# Patient Record
Sex: FEMALE | Race: WHITE | NOT HISPANIC OR LATINO | Employment: UNEMPLOYED | URBAN - METROPOLITAN AREA
[De-identification: names, ages, dates, MRNs, and addresses within clinical notes are randomized per-mention and may not be internally consistent; named-entity substitution may affect disease eponyms.]

---

## 2024-06-12 ENCOUNTER — OFFICE VISIT (OUTPATIENT)
Dept: PRIMARY CARE | Facility: CLINIC | Age: 24
End: 2024-06-12
Payer: COMMERCIAL

## 2024-06-12 VITALS
SYSTOLIC BLOOD PRESSURE: 119 MMHG | BODY MASS INDEX: 22.32 KG/M2 | DIASTOLIC BLOOD PRESSURE: 80 MMHG | HEART RATE: 100 BPM | HEIGHT: 63 IN | WEIGHT: 126 LBS | OXYGEN SATURATION: 97 % | RESPIRATION RATE: 16 BRPM | TEMPERATURE: 98.6 F

## 2024-06-12 DIAGNOSIS — Z76.89 ENCOUNTER TO ESTABLISH CARE: ICD-10-CM

## 2024-06-12 DIAGNOSIS — H92.01 OTALGIA OF RIGHT EAR: ICD-10-CM

## 2024-06-12 DIAGNOSIS — J02.9 SORE THROAT: Primary | ICD-10-CM

## 2024-06-12 DIAGNOSIS — R09.81 NASAL CONGESTION: ICD-10-CM

## 2024-06-12 PROBLEM — F34.1 PERSISTENT DEPRESSIVE DISORDER: Status: ACTIVE | Noted: 2018-10-30

## 2024-06-12 PROBLEM — Z97.3 WEARS GLASSES: Status: ACTIVE | Noted: 2021-06-28

## 2024-06-12 PROBLEM — M41.129 ADOLESCENT IDIOPATHIC SCOLIOSIS: Status: ACTIVE | Noted: 2024-06-12

## 2024-06-12 PROCEDURE — 99213 OFFICE O/P EST LOW 20 MIN: CPT | Performed by: NURSE PRACTITIONER

## 2024-06-12 PROCEDURE — 87880 STREP A ASSAY W/OPTIC: CPT | Mod: QW | Performed by: NURSE PRACTITIONER

## 2024-06-12 RX ORDER — ALBUTEROL SULFATE 90 UG/1
2 AEROSOL, METERED RESPIRATORY (INHALATION) EVERY 4 HOURS PRN
COMMUNITY
Start: 2023-10-02

## 2024-06-12 RX ORDER — FLUTICASONE PROPIONATE AND SALMETEROL XINAFOATE 230; 21 UG/1; UG/1
1 AEROSOL, METERED RESPIRATORY (INHALATION) 2 TIMES DAILY
COMMUNITY
Start: 2014-06-26

## 2024-06-12 RX ORDER — ESCITALOPRAM OXALATE 10 MG/1
10 TABLET ORAL DAILY
COMMUNITY
Start: 2023-04-20

## 2024-06-12 RX ORDER — MELOXICAM 15 MG/1
15 TABLET ORAL DAILY
COMMUNITY
Start: 2023-08-14

## 2024-06-12 RX ORDER — MONTELUKAST SODIUM 10 MG/1
1 TABLET ORAL DAILY
COMMUNITY
Start: 2022-09-23

## 2024-06-12 RX ORDER — AMOXICILLIN AND CLAVULANATE POTASSIUM 875; 125 MG/1; MG/1
875 TABLET, FILM COATED ORAL 2 TIMES DAILY
Qty: 14 TABLET | Refills: 0 | Status: SHIPPED | OUTPATIENT
Start: 2024-06-12 | End: 2024-06-19

## 2024-06-12 RX ORDER — LEVONORGESTREL AND ETHINYL ESTRADIOL 0.15-0.03
1 KIT ORAL
COMMUNITY
Start: 2023-03-29

## 2024-06-12 SDOH — ECONOMIC STABILITY: FOOD INSECURITY: WITHIN THE PAST 12 MONTHS, YOU WORRIED THAT YOUR FOOD WOULD RUN OUT BEFORE YOU GOT MONEY TO BUY MORE.: NEVER TRUE

## 2024-06-12 SDOH — ECONOMIC STABILITY: FOOD INSECURITY: WITHIN THE PAST 12 MONTHS, THE FOOD YOU BOUGHT JUST DIDN'T LAST AND YOU DIDN'T HAVE MONEY TO GET MORE.: NEVER TRUE

## 2024-06-12 ASSESSMENT — LIFESTYLE VARIABLES
HOW OFTEN DO YOU HAVE SIX OR MORE DRINKS ON ONE OCCASION: NEVER
AUDIT-C TOTAL SCORE: 1
HOW MANY STANDARD DRINKS CONTAINING ALCOHOL DO YOU HAVE ON A TYPICAL DAY: 1 OR 2
SKIP TO QUESTIONS 9-10: 1
HOW OFTEN DO YOU HAVE A DRINK CONTAINING ALCOHOL: MONTHLY OR LESS

## 2024-06-12 ASSESSMENT — COLUMBIA-SUICIDE SEVERITY RATING SCALE - C-SSRS
1. IN THE PAST MONTH, HAVE YOU WISHED YOU WERE DEAD OR WISHED YOU COULD GO TO SLEEP AND NOT WAKE UP?: NO
6. HAVE YOU EVER DONE ANYTHING, STARTED TO DO ANYTHING, OR PREPARED TO DO ANYTHING TO END YOUR LIFE?: NO
2. HAVE YOU ACTUALLY HAD ANY THOUGHTS OF KILLING YOURSELF?: NO

## 2024-06-12 ASSESSMENT — ENCOUNTER SYMPTOMS
OCCASIONAL FEELINGS OF UNSTEADINESS: 0
DEPRESSION: 0
LOSS OF SENSATION IN FEET: 0

## 2024-06-12 ASSESSMENT — PAIN SCALES - GENERAL: PAINLEVEL: 0-NO PAIN

## 2024-06-12 ASSESSMENT — PATIENT HEALTH QUESTIONNAIRE - PHQ9
2. FEELING DOWN, DEPRESSED OR HOPELESS: NOT AT ALL
SUM OF ALL RESPONSES TO PHQ9 QUESTIONS 1 AND 2: 0
1. LITTLE INTEREST OR PLEASURE IN DOING THINGS: NOT AT ALL

## 2024-06-12 NOTE — PROGRESS NOTES
"Subjective   Patient ID: Awilda Champion is a 23 y.o. female who presents for Establish Care and New Patient Visit (NPV- patient presents as new patient visit to establish new PCP.).  HPI 23-year-old female with past medical history of allergic rhinitis, mild persistent depressive disorder, scoliosis and moderate asthma presents today to establish care.    Sore throat, right greater than left ear pain and frontal sinus congestion x 3 days   No fever chills n/v  Ibuprofen   No exposure to strep or covid    On lexapro for 2 years -- depression.   Not in counseling   Never hospitalized for depression.    Denies suicidal/homicidal ideation plan.    Dr Vu's following --gyn and ocps    Scoliosis - mobic as needed.  No past surgical history.  Review of Systems  Review of systems: Present-not feeling well. Not present-chills and fever.  Skin: Not present- new lesions and rash.  HEENT: See HPI.  Neck: Not present-neck pain, neck stiffness and swollen glands.  Respiratory: Not present-difficulty breathing, cough, bloody sputum.  Cardiovascular: Not present-chest pain, palpitations, dyspnea on exertion.  Gastrointestinal: Not present-abdominal pain, bloody or very black stools, changes in bowel habits, heartburn, jaundice, nausea and vomiting.  Genitourinary: Not present-change in bladder habits, hematuria, flank pain and dysuria.  Musculoskeletal: Not present- joint swelling, joint redness.  Psychological: Depression controlled on current medical regime.  Patient denies any suicidal/homicidal ideation or plan.    Objective   /80 (BP Location: Right arm, Patient Position: Sitting, BP Cuff Size: Adult)   Pulse 100   Temp 37 °C (98.6 °F)   Resp 16   Ht 1.6 m (5' 3\")   Wt 57.2 kg (126 lb)   LMP 05/21/2024 (Approximate)   SpO2 97%   BMI 22.32 kg/m²      Physical Exam  Gen.: Mental status-alert. Gen. appearance-cooperative, well groomed and consistent with stated age. Sickly. Orientation-oriented to time, " place, purpose and person. Build and nutrition-well-nourished and well-developed. Hydration-well-hydrated.    Integumentary: Color-normal coloration skin. Skin moisture-normal skin moisture.    Head and neck: Head-normocephalic, atraumatic with no lesions or palpable masses. Face-atraumatic. Neck-full range of motion and subtle. No lymphadenopathy and no nuchal rigidity.     ENMT: Ears-no tenderness noted to the external auditory canal-bilaterally. Otoscopic exam: Tympanic membranes-left-tympanic membrane is deena, injected in appearance. Right-tympanic membrane is deena injected bulging in appearance. Nose -frontal sinuses-non-tender and no purulent drainage. Maxillary sinuses-non-tender and no purulent drainage. Mouth: Oral mucosa-pink and moist. Oropharynx: No airway distress, bulging of the pharyngeal wall, edema of the uvula.  Mild to moderate pharyngeal erythema with exudate.    Chest and lung exam:  Auscultation-normal breath sounds, no adventitious lung sounds and normal vocal resonance. Chest wall is normal in shape and non-tender.    Cardiovascular: Auscultation: Regular rate and rhythm. Heart sounds-normal heart sounds, S1-S2. No murmurs or gallops appreciated. Carotid arteries normal and without bruit.    Peripheral vascular: Lower extremity-inspection is normal bilaterally. Normal temperature and no edema bilaterally.    Assessment/Plan   Diagnoses and all orders for this visit:  Sore throat  -     POCT Rapid Strep A manually resulted  Nasal congestion  Otalgia of right ear  Encounter to establish care

## 2024-06-12 NOTE — PATIENT INSTRUCTIONS
New patient-establish care.  Sore throat, nasal congestion and ear pain   - Patient given a prescription for Augmentin as directed.  - Recommend decongestant, Flonase  - Increase fluids and rest.  Contact office with worsening condition or no resolve over the next 72 hours.  In office strep negative.    Follow-up in the next few weeks for a annual physical exam.

## 2024-06-19 ENCOUNTER — OFFICE VISIT (OUTPATIENT)
Dept: PRIMARY CARE | Facility: CLINIC | Age: 24
End: 2024-06-19
Payer: COMMERCIAL

## 2024-06-19 ENCOUNTER — LAB (OUTPATIENT)
Dept: LAB | Facility: LAB | Age: 24
End: 2024-06-19
Payer: COMMERCIAL

## 2024-06-19 VITALS
BODY MASS INDEX: 22.08 KG/M2 | OXYGEN SATURATION: 97 % | HEIGHT: 63 IN | SYSTOLIC BLOOD PRESSURE: 106 MMHG | TEMPERATURE: 97.7 F | RESPIRATION RATE: 16 BRPM | HEART RATE: 93 BPM | WEIGHT: 124.6 LBS | DIASTOLIC BLOOD PRESSURE: 71 MMHG

## 2024-06-19 DIAGNOSIS — Z13.0 SCREENING FOR DEFICIENCY ANEMIA: ICD-10-CM

## 2024-06-19 DIAGNOSIS — F41.9 ANXIETY: ICD-10-CM

## 2024-06-19 DIAGNOSIS — F34.1 PERSISTENT DEPRESSIVE DISORDER: ICD-10-CM

## 2024-06-19 DIAGNOSIS — Z13.220 LIPID SCREENING: ICD-10-CM

## 2024-06-19 DIAGNOSIS — Z13.89 SCREENING FOR BLOOD OR PROTEIN IN URINE: ICD-10-CM

## 2024-06-19 DIAGNOSIS — Z23 IMMUNIZATION DUE: ICD-10-CM

## 2024-06-19 DIAGNOSIS — Z00.00 ANNUAL PHYSICAL EXAM: Primary | ICD-10-CM

## 2024-06-19 DIAGNOSIS — Z00.00 ANNUAL PHYSICAL EXAM: ICD-10-CM

## 2024-06-19 PROBLEM — L70.9 ACNE: Status: ACTIVE | Noted: 2024-06-19

## 2024-06-19 PROBLEM — R06.02 SHORTNESS OF BREATH: Status: ACTIVE | Noted: 2024-06-19

## 2024-06-19 PROBLEM — N94.6 DYSMENORRHEA: Status: ACTIVE | Noted: 2024-06-19

## 2024-06-19 LAB
ALBUMIN SERPL BCP-MCNC: 4.1 G/DL (ref 3.4–5)
ALP SERPL-CCNC: 53 U/L (ref 33–110)
ALT SERPL W P-5'-P-CCNC: 42 U/L (ref 7–45)
ANION GAP SERPL CALC-SCNC: 11 MMOL/L (ref 10–20)
AST SERPL W P-5'-P-CCNC: 35 U/L (ref 9–39)
BILIRUB SERPL-MCNC: 0.4 MG/DL (ref 0–1.2)
BUN SERPL-MCNC: 10 MG/DL (ref 6–23)
CALCIUM SERPL-MCNC: 9.1 MG/DL (ref 8.6–10.3)
CHLORIDE SERPL-SCNC: 104 MMOL/L (ref 98–107)
CHOLEST SERPL-MCNC: 178 MG/DL (ref 0–199)
CHOLESTEROL/HDL RATIO: 4.5
CO2 SERPL-SCNC: 27 MMOL/L (ref 21–32)
CREAT SERPL-MCNC: 0.83 MG/DL (ref 0.5–1.05)
EGFRCR SERPLBLD CKD-EPI 2021: >90 ML/MIN/1.73M*2
ERYTHROCYTE [DISTWIDTH] IN BLOOD BY AUTOMATED COUNT: 12.2 % (ref 11.5–14.5)
GLUCOSE SERPL-MCNC: 80 MG/DL (ref 74–99)
HCT VFR BLD AUTO: 42.8 % (ref 36–46)
HDLC SERPL-MCNC: 39.2 MG/DL
HGB BLD-MCNC: 14.3 G/DL (ref 12–16)
LDLC SERPL CALC-MCNC: 102 MG/DL
MCH RBC QN AUTO: 29.2 PG (ref 26–34)
MCHC RBC AUTO-ENTMCNC: 33.4 G/DL (ref 32–36)
MCV RBC AUTO: 87 FL (ref 80–100)
NON HDL CHOLESTEROL: 139 MG/DL (ref 0–149)
NRBC BLD-RTO: 0 /100 WBCS (ref 0–0)
PLATELET # BLD AUTO: 204 X10*3/UL (ref 150–450)
POTASSIUM SERPL-SCNC: 4.1 MMOL/L (ref 3.5–5.3)
PROT SERPL-MCNC: 7.1 G/DL (ref 6.4–8.2)
RBC # BLD AUTO: 4.9 X10*6/UL (ref 4–5.2)
SODIUM SERPL-SCNC: 138 MMOL/L (ref 136–145)
TRIGL SERPL-MCNC: 183 MG/DL (ref 0–149)
TSH SERPL-ACNC: 1.51 MIU/L (ref 0.44–3.98)
VLDL: 37 MG/DL (ref 0–40)
WBC # BLD AUTO: 5 X10*3/UL (ref 4.4–11.3)

## 2024-06-19 PROCEDURE — 84443 ASSAY THYROID STIM HORMONE: CPT

## 2024-06-19 PROCEDURE — 85027 COMPLETE CBC AUTOMATED: CPT

## 2024-06-19 PROCEDURE — 80053 COMPREHEN METABOLIC PANEL: CPT

## 2024-06-19 PROCEDURE — 36415 COLL VENOUS BLD VENIPUNCTURE: CPT

## 2024-06-19 PROCEDURE — 80061 LIPID PANEL: CPT

## 2024-06-19 PROCEDURE — 99214 OFFICE O/P EST MOD 30 MIN: CPT | Performed by: NURSE PRACTITIONER

## 2024-06-19 PROCEDURE — 90715 TDAP VACCINE 7 YRS/> IM: CPT | Performed by: NURSE PRACTITIONER

## 2024-06-19 ASSESSMENT — ENCOUNTER SYMPTOMS
OCCASIONAL FEELINGS OF UNSTEADINESS: 0
DEPRESSION: 0
LOSS OF SENSATION IN FEET: 0

## 2024-06-19 ASSESSMENT — PAIN SCALES - GENERAL: PAINLEVEL: 0-NO PAIN

## 2024-06-19 NOTE — PATIENT INSTRUCTIONS
Annual exam   Fasting labs to be obtained.   She will be notified of results as they become available.   Follow-up in 1 year or sooner as needed.    Tdap without adverse reaction.

## 2024-06-19 NOTE — PROGRESS NOTES
Subjective   Patient ID: Awilda Champion is a 23 y.o. female who presents for Annual Exam (Patient presents for Annual physical Exam visit today.).  HPI 23-year-old female with past medical history of asthma and major depression presents today for annual physical exam.  Depression controlled on current medical regime per patient.  She denies suicidal/homicidal ideation.  Asthma under good control presently on current medical regime.    Declines hiv and hep c screening   No smoking or vaping     Review of Systems  Review of systems: Present-feeling well. Not present-chills, fatigue and fever.  Skin: Not present-change in wart/mole, dryness, hives, new lesions and rash.  HEENT: Not present-headache, diplopia, visual loss, ear pain, tinnitus, vertigo, seasonal allergies, nasal congestion, and sore throat.  Neck: Not present-neck pain, neck stiffness and swollen glands.  Respiratory: Not present-difficulty breathing, cough, bloody sputum.  Cardiovascular: Not present-abnormal blood pressure, chest pain, edema, fainting, leg pain, leg swelling, palpitations, dyspnea on exertion, shortness of breath and slow heart rate.  Gastrointestinal: Not present-abdominal pain, bloody or very black stools, changes in bowel habits, heartburn, incontinence of stool, jaundice, nausea and vomiting.  Genitourinary: Not present-change in bladder habits, hematuria, flank pain, frequency, urinary incontinence, urgency and dysuria.  Musculoskeletal: Not present-back pain, claudication, joint pain, joint swelling, joint redness, and muscular weakness.  Neurological: Not present-dizziness, headache, trouble walking, unsteadiness, vertigo, weakness, numbness or tingling.  Psychiatric: Not present-anxiety, impaired cognitive functioning, insomnia, depression, trouble falling asleep, panic attacks, suicidal ideation, suicidal planning, thoughts of hurting others and thoughts of self-harm.  Endocrine: Not present-appetite changes, polydipsia,  "polyuria, and thyroid problems.  Hematology:  Not present-anemia, excessive bleeding, bruising and epistaxis.    Objective   /71 (BP Location: Right arm, Patient Position: Sitting, BP Cuff Size: Adult)   Pulse 93   Temp 36.5 °C (97.7 °F) (Temporal)   Resp 16   Ht 1.6 m (5' 3\")   Wt 56.5 kg (124 lb 9.6 oz)   LMP 06/19/2024 (Approximate)   SpO2 97%   BMI 22.07 kg/m²      Physical Exam  Gen.: Mental status-alert. Gen. appearance-cooperative, well groomed and consistent with stated age. Not in acute distress or sickly. Orientation-oriented to time, place, purpose and person. Build and nutrition-well-nourished and well-developed. Hydration-well-hydrated.    Integumentary: Color-normal coloration skin. Skin moisture-normal skin moisture.    Head and neck: Head-normocephalic, atraumatic with no lesions or palpable masses. Face-atraumatic. Neck-full range of motion and subtle. No lymphadenopathy and no nuchal rigidity. Thyroid-normal size and consistency with no palpable lumps.    ENMT: Ears-no tenderness noted to the external auditory canal-bilaterally. Otoscopic exam: Tympanic membranes-left-tympanic membrane is gray in appearance. Right-tympanic membrane is gray in appearance. Nose -frontal sinuses-non-tender and no purulent drainage. Maxillary sinuses-non-tender and no purulent drainage. Mouth: Oral mucosa-pink and moist. Oropharynx: No airway distress, bulging of the pharyngeal wall, edema of the uvula, and no pharyngeal erythema.    Chest and lung exam:  Auscultation-normal breath sounds, no adventitious lung sounds and normal vocal resonance. Chest wall is normal in shape and non-tender.    Cardiovascular: Auscultation: Regular rate and rhythm. Heart sounds-normal heart sounds, S1-S2. No murmurs or gallops appreciated. Carotid arteries normal and without bruit.    Abdomen: Non-tender, no rigidity, and no palpable masses. Auscultation-auscultation of the abdomen reveals normal bowel sounds throughout. "     Peripheral vascular: Lower extremity-inspection is normal bilaterally. Normal temperature and no edema bilaterally.    Neurologic: Mental [id number]. Cranial nerves: Cranial nerves II through XII grossly intact. Normal gait.    Musculoskeletal: Examination of the spine with no step-offs or point tenderness.  Full range of motion of the spine without pain or difficulty. Right lower extremity-normal strength and tone, normal range of motion without pain. Left lower extremity-normal strength and tone, normal range of motion without pain.  Assessment/Plan   Diagnoses and all orders for this visit:  Annual physical exam  -     CBC; Future  -     Comprehensive Metabolic Panel; Future  -     Lipid Panel; Future  -     TSH with reflex to Free T4 if abnormal; Future  Anxiety  -     TSH with reflex to Free T4 if abnormal; Future  Lipid screening  -     Lipid Panel; Future  Persistent depressive disorder  -     Comprehensive Metabolic Panel; Future  Screening for deficiency anemia  -     CBC; Future  Screening for blood or protein in urine  Immunization due  -     Tdap vaccine, age 7 years and older  (BOOSTRIX)

## 2024-08-14 DIAGNOSIS — N94.6 DYSMENORRHEA: Primary | ICD-10-CM

## 2024-08-14 RX ORDER — LEVONORGESTREL AND ETHINYL ESTRADIOL 0.15-0.03
1 KIT ORAL
Qty: 90 TABLET | Refills: 3 | Status: SHIPPED | OUTPATIENT
Start: 2024-08-14

## 2024-09-04 ENCOUNTER — APPOINTMENT (OUTPATIENT)
Dept: OBSTETRICS AND GYNECOLOGY | Facility: CLINIC | Age: 24
End: 2024-09-04
Payer: COMMERCIAL

## 2024-10-11 ENCOUNTER — APPOINTMENT (OUTPATIENT)
Dept: OBSTETRICS AND GYNECOLOGY | Facility: CLINIC | Age: 24
End: 2024-10-11
Payer: COMMERCIAL

## 2024-10-11 VITALS
WEIGHT: 124 LBS | HEIGHT: 63 IN | SYSTOLIC BLOOD PRESSURE: 100 MMHG | DIASTOLIC BLOOD PRESSURE: 68 MMHG | BODY MASS INDEX: 21.97 KG/M2

## 2024-10-11 DIAGNOSIS — Z01.419 ENCOUNTER FOR ANNUAL ROUTINE GYNECOLOGICAL EXAMINATION: Primary | ICD-10-CM

## 2024-10-11 DIAGNOSIS — N94.6 DYSMENORRHEA: ICD-10-CM

## 2024-10-11 PROCEDURE — 3008F BODY MASS INDEX DOCD: CPT | Performed by: OBSTETRICS & GYNECOLOGY

## 2024-10-11 PROCEDURE — 1036F TOBACCO NON-USER: CPT | Performed by: OBSTETRICS & GYNECOLOGY

## 2024-10-11 PROCEDURE — 99385 PREV VISIT NEW AGE 18-39: CPT | Performed by: OBSTETRICS & GYNECOLOGY

## 2024-10-11 RX ORDER — LEVONORGESTREL AND ETHINYL ESTRADIOL 0.15-0.03
1 KIT ORAL
Qty: 90 TABLET | Refills: 4 | Status: SHIPPED | OUTPATIENT
Start: 2024-10-11

## 2024-10-11 ASSESSMENT — PAIN SCALES - GENERAL: PAINLEVEL: 0-NO PAIN

## 2024-10-11 NOTE — PROGRESS NOTES
"Assessment     PLAN  1. Encounter for annual routine gynecological examination  - pap up to date    2. Dysmenorrhea  - well controlled with OCP, refills sent  - levonorgestreL-ethinyl estrad ([place]o, 28,) 0.15-0.03 mg tablet; Take 1 tablet by mouth once daily.  Dispense: 90 tablet; Refill: 4    Please return for your next visit in 1 year or sooner as needed.    Deya Mei MD      Subjective     Awilda Champion is a 24 y.o. female who is here for a routine exam.   PCP = Lydia Mackay, APRN-CNP    APE Concerns: Denies    Gynecologic History:    OBHx: G0  Menses: regular, no concerns, pain resolved with OCP  Last Pap: NILM 8/2023  HPV Vaccine: thinks so, will confirm  History of Dysplasia: denies  Sexually active: yes  STI testing: denies  Contraception: OCP, needs refills  Mammogram: NA  FamHx of GYN cancers:  Denies      PMH, PSH, FH, SH, medications and allergies reviewed and edited as needed.      Objective   /68   Ht 1.6 m (5' 3\")   Wt 56.2 kg (124 lb)   LMP 10/09/2024 (Exact Date)   BMI 21.97 kg/m²      General:   Alert and oriented, in no acute distress   Neck: Supple. No visible thyromegaly.    Breast/Axilla: Normal to palpation bilaterally without masses, skin changes, or nipple discharge.    Abdomen: Soft, non-tender, without masses or organomegaly   Vulva: Normal architecture without erythema, masses, or lesions.    Vagina: Normal mucosa without lesions, masses, or atrophy. No abnormal vaginal discharge.    Cervix: Normal without masses, lesions, or signs of cervicitis.    Uterus: Normal mobile, non-enlarged uterus    Adnexa: Normal without masses or lesions   Pelvic Floor No POP noted. No high tone pelvic floor   Psych Normal affect. Normal mood.        "

## 2025-01-07 ENCOUNTER — TELEPHONE (OUTPATIENT)
Dept: OBSTETRICS AND GYNECOLOGY | Facility: CLINIC | Age: 25
End: 2025-01-07
Payer: COMMERCIAL

## 2025-01-07 DIAGNOSIS — J45.40 MODERATE PERSISTENT ASTHMA WITHOUT COMPLICATION (HHS-HCC): Primary | ICD-10-CM

## 2025-01-08 RX ORDER — MONTELUKAST SODIUM 10 MG/1
10 TABLET ORAL DAILY
Qty: 90 TABLET | Refills: 1 | Status: SHIPPED | OUTPATIENT
Start: 2025-01-08

## 2025-04-08 ENCOUNTER — TELEPHONE (OUTPATIENT)
Dept: OBSTETRICS AND GYNECOLOGY | Facility: CLINIC | Age: 25
End: 2025-04-08
Payer: COMMERCIAL

## 2025-04-10 DIAGNOSIS — F41.9 ANXIETY: Primary | ICD-10-CM

## 2025-04-10 RX ORDER — ESCITALOPRAM OXALATE 10 MG/1
10 TABLET ORAL DAILY
Qty: 30 TABLET | Refills: 0 | OUTPATIENT
Start: 2025-04-10 | End: 2025-05-10

## 2025-04-10 RX ORDER — ESCITALOPRAM OXALATE 10 MG/1
10 TABLET ORAL DAILY
Qty: 30 TABLET | Refills: 1 | Status: SHIPPED | OUTPATIENT
Start: 2025-04-10

## 2025-04-11 DIAGNOSIS — J45.40 MODERATE PERSISTENT ASTHMA WITHOUT COMPLICATION (HHS-HCC): ICD-10-CM

## 2025-04-14 RX ORDER — FLUTICASONE PROPIONATE AND SALMETEROL 250; 50 UG/1; UG/1
1 POWDER RESPIRATORY (INHALATION) 2 TIMES DAILY
Qty: 60 EACH | Refills: 1 | Status: SHIPPED | OUTPATIENT
Start: 2025-04-14

## 2025-04-16 ENCOUNTER — TELEPHONE (OUTPATIENT)
Dept: PRIMARY CARE | Facility: CLINIC | Age: 25
End: 2025-04-16
Payer: COMMERCIAL

## 2025-07-15 DIAGNOSIS — N94.6 DYSMENORRHEA: ICD-10-CM

## 2025-07-15 RX ORDER — LEVONORGESTREL AND ETHINYL ESTRADIOL 0.15-0.03
1 KIT ORAL DAILY
Qty: 28 TABLET | Refills: 0 | Status: SHIPPED | OUTPATIENT
Start: 2025-07-15

## 2025-07-21 NOTE — PROGRESS NOTES
Subjective   Patient ID: Awilda Champion is a 24 y.o. female who presents for No chief complaint on file..  HPI 24-year-old female with past medical history of asthma and major depression presents today for annual physical exam.    Depression controlled on current medical regime per patient.  She denies suicidal/homicidal ideation.  Asthma under good control presently on current medical regime.     Declines hiv and hep c screening   No smoking or vaping     Care team  Aliya Mei - gyn     Last eye exam: wears glasses - 1 year ago   Last dental exam: a month ago   Last pap test: one year ago   Immunizations: up to date   Declines hiv and hep c     Review of Systems  Review of systems: Present-feeling well. Not present-chills, fatigue and fever.  Skin: Not present- new lesions and rash.  HEENT: Not present-headache, ear pain, seasonal allergies, nasal congestion, and sore throat.  Neck: Not present-neck pain, neck stiffness and swollen glands.  Respiratory: Not present-difficulty breathing, cough, bloody sputum.  Cardiovascular: Not present-abnormal blood pressure, chest pain, edema, dyspnea on exertion.  Gastrointestinal: Not present-abdominal pain, bloody or very black stools, changes in bowel habits, heartburn, nausea and vomiting.  Genitourinary: Not present-change in bladder habits, hematuria, flank pain, frequency, urinary incontinence, urgency and dysuria.  Musculoskeletal: Not present-joint pain, joint swelling, joint redness.  Neurological: Not present-dizziness, headache, trouble walking, vertigo, weakness, numbness or tingling.  Psychiatric: Not present-anxiety, impaired cognitive functioning, insomnia, depression, trouble falling asleep, panic attacks, suicidal ideation, suicidal planning, thoughts of hurting others and thoughts of self-harm.  Endocrine: Not present-appetite changes, polydipsia, polyuria, and thyroid problems.  Hematology:  Not present-anemia, excessive bleeding, bruising and  epistaxis.    Objective   There were no vitals taken for this visit.     Physical Exam  Gen.: Mental status-alert. Gen. appearance-cooperative, well groomed and consistent with stated age. Not in acute distress or sickly. Orientation-oriented to time, place, purpose and person. Build and nutrition-well-nourished and well-developed. Hydration-well-hydrated.    Integumentary: Color-normal coloration skin. Skin moisture-normal skin moisture.    Head and neck: Head-normocephalic, atraumatic with no lesions or palpable masses. Face-atraumatic. Neck-full range of motion and subtle. No lymphadenopathy and no nuchal rigidity. Thyroid-normal size and consistency with no palpable lumps.    ENMT: Ears-no tenderness noted to the external auditory canal-bilaterally. Otoscopic exam: Tympanic membranes-left-tympanic membrane is gray in appearance. Right-tympanic membrane is gray in appearance. Nose -frontal sinuses-non-tender and no purulent drainage. Maxillary sinuses-non-tender and no purulent drainage. Mouth: Oral mucosa-pink and moist. Oropharynx: No airway distress, bulging of the pharyngeal wall, edema of the uvula, and no pharyngeal erythema.    Chest and lung exam: Auscultation-normal breath sounds, no adventitious lung sounds and normal vocal resonance. Chest wall is normal in shape and non-tender.    Cardiovascular: Auscultation: Regular rate and rhythm. Heart sounds-normal heart sounds, S1-S2. No murmurs appreciated. Carotid arteries normal and without bruit.    Abdomen: Non-tender, no rigidity, and no palpable masses. There is no hepatosplenomegaly. Auscultation-auscultation of the abdomen reveals normal bowel sounds throughout.     Peripheral vascular: Normal temperature and no edema bilaterally.    Neurologic: Mental [id number]. Cranial nerves: Cranial nerves II through XII grossly intact. Normal gait.    Musculoskeletal: Examination of the spine with no step-offs or point tenderness.  Full range of  motion of the spine without pain or difficulty.   Right lower extremity-normal strength and tone, normal range of motion without pain.   Left lower extremity-normal strength and tone, normal range of motion without pain.  Assessment/Plan   Diagnoses and all orders for this visit:  Annual physical exam  -     CBC; Future  -     Comprehensive Metabolic Panel; Future  -     Lipid Panel; Future  -     TSH with reflex to Free T4 if abnormal; Future  -     POCT UA Automated manually resulted  Screening for deficiency anemia  -     CBC; Future  Anxiety  -     TSH with reflex to Free T4 if abnormal; Future  -     escitalopram (Lexapro) 10 mg tablet; Take 1 tablet (10 mg) by mouth once daily.  Screening for blood or protein in urine  -     POCT UA Automated manually resulted  Lipid screening  -     Lipid Panel; Future

## 2025-07-22 ASSESSMENT — PROMIS GLOBAL HEALTH SCALE
CARRYOUT_PHYSICAL_ACTIVITIES: COMPLETELY
RATE_AVERAGE_FATIGUE: MODERATE
RATE_SOCIAL_SATISFACTION: VERY GOOD
RATE_MENTAL_HEALTH: FAIR
RATE_QUALITY_OF_LIFE: VERY GOOD
RATE_GENERAL_HEALTH: GOOD
RATE_PHYSICAL_HEALTH: FAIR
RATE_AVERAGE_PAIN: 0
EMOTIONAL_PROBLEMS: SOMETIMES
CARRYOUT_SOCIAL_ACTIVITIES: EXCELLENT

## 2025-07-23 ENCOUNTER — OFFICE VISIT (OUTPATIENT)
Dept: PRIMARY CARE | Facility: CLINIC | Age: 25
End: 2025-07-23
Payer: COMMERCIAL

## 2025-07-23 VITALS
HEART RATE: 104 BPM | OXYGEN SATURATION: 97 % | WEIGHT: 120.8 LBS | BODY MASS INDEX: 21.4 KG/M2 | HEIGHT: 63 IN | DIASTOLIC BLOOD PRESSURE: 79 MMHG | SYSTOLIC BLOOD PRESSURE: 117 MMHG | TEMPERATURE: 98.6 F

## 2025-07-23 DIAGNOSIS — Z13.220 LIPID SCREENING: ICD-10-CM

## 2025-07-23 DIAGNOSIS — Z13.0 SCREENING FOR DEFICIENCY ANEMIA: ICD-10-CM

## 2025-07-23 DIAGNOSIS — Z13.89 SCREENING FOR BLOOD OR PROTEIN IN URINE: ICD-10-CM

## 2025-07-23 DIAGNOSIS — F41.9 ANXIETY: ICD-10-CM

## 2025-07-23 DIAGNOSIS — Z00.00 ANNUAL PHYSICAL EXAM: Primary | ICD-10-CM

## 2025-07-23 LAB
POC APPEARANCE, URINE: CLEAR
POC BILIRUBIN, URINE: NEGATIVE
POC BLOOD, URINE: ABNORMAL
POC COLOR, URINE: YELLOW
POC GLUCOSE, URINE: NEGATIVE MG/DL
POC KETONES, URINE: NEGATIVE MG/DL
POC LEUKOCYTES, URINE: NEGATIVE
POC NITRITE,URINE: NEGATIVE
POC PH, URINE: 5.5 PH
POC PROTEIN, URINE: NEGATIVE MG/DL
POC SPECIFIC GRAVITY, URINE: 1.02
POC UROBILINOGEN, URINE: 0.2 EU/DL

## 2025-07-23 PROCEDURE — 81003 URINALYSIS AUTO W/O SCOPE: CPT | Mod: QW | Performed by: NURSE PRACTITIONER

## 2025-07-23 PROCEDURE — 99395 PREV VISIT EST AGE 18-39: CPT | Performed by: NURSE PRACTITIONER

## 2025-07-23 PROCEDURE — 3008F BODY MASS INDEX DOCD: CPT | Performed by: NURSE PRACTITIONER

## 2025-07-23 RX ORDER — ESCITALOPRAM OXALATE 10 MG/1
10 TABLET ORAL DAILY
Qty: 90 TABLET | Refills: 0 | Status: SHIPPED | OUTPATIENT
Start: 2025-07-23

## 2025-07-23 SDOH — ECONOMIC STABILITY: FOOD INSECURITY: WITHIN THE PAST 12 MONTHS, YOU WORRIED THAT YOUR FOOD WOULD RUN OUT BEFORE YOU GOT MONEY TO BUY MORE.: NEVER TRUE

## 2025-07-23 SDOH — ECONOMIC STABILITY: FOOD INSECURITY: WITHIN THE PAST 12 MONTHS, THE FOOD YOU BOUGHT JUST DIDN'T LAST AND YOU DIDN'T HAVE MONEY TO GET MORE.: NEVER TRUE

## 2025-07-23 ASSESSMENT — LIFESTYLE VARIABLES
HOW OFTEN DO YOU HAVE SIX OR MORE DRINKS ON ONE OCCASION: NEVER
AUDIT-C TOTAL SCORE: 1
SKIP TO QUESTIONS 9-10: 1
HOW MANY STANDARD DRINKS CONTAINING ALCOHOL DO YOU HAVE ON A TYPICAL DAY: 1 OR 2
HOW OFTEN DO YOU HAVE A DRINK CONTAINING ALCOHOL: MONTHLY OR LESS

## 2025-07-23 ASSESSMENT — ENCOUNTER SYMPTOMS
LOSS OF SENSATION IN FEET: 0
OCCASIONAL FEELINGS OF UNSTEADINESS: 0
DEPRESSION: 0

## 2025-07-23 ASSESSMENT — PAIN SCALES - GENERAL: PAINLEVEL_OUTOF10: 0-NO PAIN

## 2025-07-23 NOTE — PATIENT INSTRUCTIONS
AE with fasting labs  UA trace blood -- just finishing her menses otherwise unremarkable.    Anxious Depression - Continue with current medical regime  Denies suicidal/homicidal ideation or plan  Avoid alcohol/ caffeine  Good sleep hygiene discussed  Contact office or go to ER with worsening condition or feel that you may be of harm to self or others.     Recommend:  Limit processed foods and added sugars.  Reducing these foods will help lower risk for obesity diabetes and heart disease.  Stay hydrated - limit sugary drinks and high calorie beverages.  Physical activity to include regular movement with goal of at least 150 minutes/week of moderate intensity aerobic activity (brisk walk, cycling) or 75 minutes of vigorous activity (running).  Recommend twice weekly weightbearing/strengthening exercise to improve bone density and build muscle.  Prioritize sleep habits-getting 7 to 9 hours sleep nightly will improve mood, cognitive functioning, performance and immune support.    Stress management - Social connections improve mood and feeling of connectedness.  Limit alcohol and caffeine.

## 2025-07-30 LAB
ALBUMIN SERPL-MCNC: 4.4 G/DL (ref 3.6–5.1)
ALP SERPL-CCNC: 32 U/L (ref 31–125)
ALT SERPL-CCNC: 9 U/L (ref 6–29)
ANION GAP SERPL CALCULATED.4IONS-SCNC: 9 MMOL/L (CALC) (ref 7–17)
AST SERPL-CCNC: 12 U/L (ref 10–30)
BILIRUB SERPL-MCNC: 0.4 MG/DL (ref 0.2–1.2)
BUN SERPL-MCNC: 11 MG/DL (ref 7–25)
CALCIUM SERPL-MCNC: 9.1 MG/DL (ref 8.6–10.2)
CHLORIDE SERPL-SCNC: 105 MMOL/L (ref 98–110)
CHOLEST SERPL-MCNC: 194 MG/DL
CHOLEST/HDLC SERPL: 3.4 (CALC)
CO2 SERPL-SCNC: 26 MMOL/L (ref 20–32)
CREAT SERPL-MCNC: 0.74 MG/DL (ref 0.5–0.96)
EGFRCR SERPLBLD CKD-EPI 2021: 117 ML/MIN/1.73M2
ERYTHROCYTE [DISTWIDTH] IN BLOOD BY AUTOMATED COUNT: 12.3 % (ref 11–15)
GLUCOSE SERPL-MCNC: 78 MG/DL (ref 65–99)
HCT VFR BLD AUTO: 43.5 % (ref 35–45)
HDLC SERPL-MCNC: 57 MG/DL
HGB BLD-MCNC: 14.4 G/DL (ref 11.7–15.5)
LDLC SERPL CALC-MCNC: 111 MG/DL (CALC)
MCH RBC QN AUTO: 30.1 PG (ref 27–33)
MCHC RBC AUTO-ENTMCNC: 33.1 G/DL (ref 32–36)
MCV RBC AUTO: 91 FL (ref 80–100)
NONHDLC SERPL-MCNC: 137 MG/DL (CALC)
PLATELET # BLD AUTO: 228 THOUSAND/UL (ref 140–400)
PMV BLD REES-ECKER: 9.6 FL (ref 7.5–12.5)
POTASSIUM SERPL-SCNC: 4.1 MMOL/L (ref 3.5–5.3)
PROT SERPL-MCNC: 7 G/DL (ref 6.1–8.1)
RBC # BLD AUTO: 4.78 MILLION/UL (ref 3.8–5.1)
SODIUM SERPL-SCNC: 140 MMOL/L (ref 135–146)
TRIGL SERPL-MCNC: 150 MG/DL
TSH SERPL-ACNC: 0.72 MIU/L
WBC # BLD AUTO: 7.6 THOUSAND/UL (ref 3.8–10.8)

## 2025-08-11 DIAGNOSIS — N94.6 DYSMENORRHEA: ICD-10-CM

## 2025-08-12 RX ORDER — LEVONORGESTREL AND ETHINYL ESTRADIOL 0.15-0.03
1 KIT ORAL DAILY
Qty: 28 TABLET | Refills: 0 | Status: SHIPPED | OUTPATIENT
Start: 2025-08-12

## 2025-10-13 ENCOUNTER — APPOINTMENT (OUTPATIENT)
Dept: OBSTETRICS AND GYNECOLOGY | Facility: CLINIC | Age: 25
End: 2025-10-13
Payer: COMMERCIAL